# Patient Record
Sex: FEMALE | Race: OTHER | NOT HISPANIC OR LATINO | URBAN - METROPOLITAN AREA
[De-identification: names, ages, dates, MRNs, and addresses within clinical notes are randomized per-mention and may not be internally consistent; named-entity substitution may affect disease eponyms.]

---

## 2019-07-06 ENCOUNTER — INPATIENT (INPATIENT)
Facility: HOSPITAL | Age: 74
LOS: 2 days | Discharge: HOME | End: 2019-07-09
Attending: HOSPITALIST | Admitting: HOSPITALIST
Payer: COMMERCIAL

## 2019-07-06 VITALS
HEART RATE: 124 BPM | OXYGEN SATURATION: 97 % | DIASTOLIC BLOOD PRESSURE: 70 MMHG | TEMPERATURE: 98 F | RESPIRATION RATE: 18 BRPM | SYSTOLIC BLOOD PRESSURE: 116 MMHG

## 2019-07-06 DIAGNOSIS — Z29.9 ENCOUNTER FOR PROPHYLACTIC MEASURES, UNSPECIFIED: ICD-10-CM

## 2019-07-06 DIAGNOSIS — A41.9 SEPSIS, UNSPECIFIED ORGANISM: ICD-10-CM

## 2019-07-06 DIAGNOSIS — E03.9 HYPOTHYROIDISM, UNSPECIFIED: ICD-10-CM

## 2019-07-06 DIAGNOSIS — F32.9 MAJOR DEPRESSIVE DISORDER, SINGLE EPISODE, UNSPECIFIED: ICD-10-CM

## 2019-07-06 DIAGNOSIS — N39.0 URINARY TRACT INFECTION, SITE NOT SPECIFIED: ICD-10-CM

## 2019-07-06 DIAGNOSIS — E87.1 HYPO-OSMOLALITY AND HYPONATREMIA: ICD-10-CM

## 2019-07-06 DIAGNOSIS — J45.20 MILD INTERMITTENT ASTHMA, UNCOMPLICATED: ICD-10-CM

## 2019-07-06 DIAGNOSIS — Z96.651 PRESENCE OF RIGHT ARTIFICIAL KNEE JOINT: Chronic | ICD-10-CM

## 2019-07-06 DIAGNOSIS — N17.9 ACUTE KIDNEY FAILURE, UNSPECIFIED: ICD-10-CM

## 2019-07-06 DIAGNOSIS — I10 ESSENTIAL (PRIMARY) HYPERTENSION: ICD-10-CM

## 2019-07-06 LAB
ALBUMIN SERPL ELPH-MCNC: 3.6 G/DL — SIGNIFICANT CHANGE UP (ref 3.3–5)
ALP SERPL-CCNC: 104 U/L — SIGNIFICANT CHANGE UP (ref 40–120)
ALT FLD-CCNC: 23 U/L — SIGNIFICANT CHANGE UP (ref 4–33)
ANION GAP SERPL CALC-SCNC: 16 MMO/L — HIGH (ref 7–14)
APPEARANCE UR: SIGNIFICANT CHANGE UP
AST SERPL-CCNC: 28 U/L — SIGNIFICANT CHANGE UP (ref 4–32)
BASE EXCESS BLDV CALC-SCNC: -2 MMOL/L — SIGNIFICANT CHANGE UP
BASOPHILS # BLD AUTO: 0.01 K/UL — SIGNIFICANT CHANGE UP (ref 0–0.2)
BASOPHILS NFR BLD AUTO: 0.1 % — SIGNIFICANT CHANGE UP (ref 0–2)
BILIRUB SERPL-MCNC: 1.1 MG/DL — SIGNIFICANT CHANGE UP (ref 0.2–1.2)
BILIRUB UR-MCNC: NEGATIVE — SIGNIFICANT CHANGE UP
BLOOD GAS VENOUS - CREATININE: 1.79 MG/DL — HIGH (ref 0.5–1.3)
BLOOD GAS VENOUS - FIO2: 21 — SIGNIFICANT CHANGE UP
BLOOD UR QL VISUAL: SIGNIFICANT CHANGE UP
BUN SERPL-MCNC: 20 MG/DL — SIGNIFICANT CHANGE UP (ref 7–23)
CALCIUM SERPL-MCNC: 9.1 MG/DL — SIGNIFICANT CHANGE UP (ref 8.4–10.5)
CHLORIDE BLDV-SCNC: 102 MMOL/L — SIGNIFICANT CHANGE UP (ref 96–108)
CHLORIDE SERPL-SCNC: 97 MMOL/L — LOW (ref 98–107)
CO2 SERPL-SCNC: 18 MMOL/L — LOW (ref 22–31)
COLOR SPEC: SIGNIFICANT CHANGE UP
CREAT SERPL-MCNC: 1.64 MG/DL — HIGH (ref 0.5–1.3)
EOSINOPHIL # BLD AUTO: 0 K/UL — SIGNIFICANT CHANGE UP (ref 0–0.5)
EOSINOPHIL NFR BLD AUTO: 0 % — SIGNIFICANT CHANGE UP (ref 0–6)
GAS PNL BLDV: 128 MMOL/L — LOW (ref 136–146)
GLUCOSE BLDV-MCNC: 103 MG/DL — HIGH (ref 70–99)
GLUCOSE SERPL-MCNC: 108 MG/DL — HIGH (ref 70–99)
GLUCOSE UR-MCNC: NEGATIVE — SIGNIFICANT CHANGE UP
HCO3 BLDV-SCNC: 23 MMOL/L — SIGNIFICANT CHANGE UP (ref 20–27)
HCT VFR BLD CALC: 32.6 % — LOW (ref 34.5–45)
HCT VFR BLDV CALC: 34.9 % — SIGNIFICANT CHANGE UP (ref 34.5–45)
HGB BLD-MCNC: 11.4 G/DL — LOW (ref 11.5–15.5)
HGB BLDV-MCNC: 11.3 G/DL — LOW (ref 11.5–15.5)
IMM GRANULOCYTES NFR BLD AUTO: 0.9 % — SIGNIFICANT CHANGE UP (ref 0–1.5)
KETONES UR-MCNC: NEGATIVE — SIGNIFICANT CHANGE UP
LACTATE BLDV-MCNC: 1.9 MMOL/L — SIGNIFICANT CHANGE UP (ref 0.5–2)
LEUKOCYTE ESTERASE UR-ACNC: SIGNIFICANT CHANGE UP
LIDOCAIN IGE QN: 28.1 U/L — SIGNIFICANT CHANGE UP (ref 7–60)
LYMPHOCYTES # BLD AUTO: 0.26 K/UL — LOW (ref 1–3.3)
LYMPHOCYTES # BLD AUTO: 3 % — LOW (ref 13–44)
MCHC RBC-ENTMCNC: 30.7 PG — SIGNIFICANT CHANGE UP (ref 27–34)
MCHC RBC-ENTMCNC: 35 % — SIGNIFICANT CHANGE UP (ref 32–36)
MCV RBC AUTO: 87.9 FL — SIGNIFICANT CHANGE UP (ref 80–100)
MONOCYTES # BLD AUTO: 0.1 K/UL — SIGNIFICANT CHANGE UP (ref 0–0.9)
MONOCYTES NFR BLD AUTO: 1.2 % — LOW (ref 2–14)
NEUTROPHILS # BLD AUTO: 8.16 K/UL — HIGH (ref 1.8–7.4)
NEUTROPHILS NFR BLD AUTO: 94.8 % — HIGH (ref 43–77)
NITRITE UR-MCNC: NEGATIVE — SIGNIFICANT CHANGE UP
NRBC # FLD: 0 K/UL — SIGNIFICANT CHANGE UP (ref 0–0)
PCO2 BLDV: 30 MMHG — LOW (ref 41–51)
PH BLDV: 7.47 PH — HIGH (ref 7.32–7.43)
PH UR: 6.5 — SIGNIFICANT CHANGE UP (ref 5–8)
PLATELET # BLD AUTO: 161 K/UL — SIGNIFICANT CHANGE UP (ref 150–400)
PMV BLD: 11 FL — SIGNIFICANT CHANGE UP (ref 7–13)
PO2 BLDV: 45 MMHG — HIGH (ref 35–40)
POTASSIUM BLDV-SCNC: 3.6 MMOL/L — SIGNIFICANT CHANGE UP (ref 3.4–4.5)
POTASSIUM SERPL-MCNC: 4.3 MMOL/L — SIGNIFICANT CHANGE UP (ref 3.5–5.3)
POTASSIUM SERPL-SCNC: 4.3 MMOL/L — SIGNIFICANT CHANGE UP (ref 3.5–5.3)
PROT SERPL-MCNC: 6.7 G/DL — SIGNIFICANT CHANGE UP (ref 6–8.3)
PROT UR-MCNC: 100 — HIGH
RBC # BLD: 3.71 M/UL — LOW (ref 3.8–5.2)
RBC # FLD: 12 % — SIGNIFICANT CHANGE UP (ref 10.3–14.5)
SAO2 % BLDV: 81.5 % — SIGNIFICANT CHANGE UP (ref 60–85)
SODIUM SERPL-SCNC: 131 MMOL/L — LOW (ref 135–145)
SP GR SPEC: > 1.04 — HIGH (ref 1–1.04)
UROBILINOGEN FLD QL: NORMAL — SIGNIFICANT CHANGE UP
WBC # BLD: 8.61 K/UL — SIGNIFICANT CHANGE UP (ref 3.8–10.5)
WBC # FLD AUTO: 8.61 K/UL — SIGNIFICANT CHANGE UP (ref 3.8–10.5)

## 2019-07-06 PROCEDURE — 99223 1ST HOSP IP/OBS HIGH 75: CPT

## 2019-07-06 PROCEDURE — 74177 CT ABD & PELVIS W/CONTRAST: CPT | Mod: 26

## 2019-07-06 RX ORDER — SODIUM CHLORIDE 9 MG/ML
1000 INJECTION, SOLUTION INTRAVENOUS ONCE
Refills: 0 | Status: COMPLETED | OUTPATIENT
Start: 2019-07-06 | End: 2019-07-06

## 2019-07-06 RX ORDER — ACETAMINOPHEN 500 MG
650 TABLET ORAL ONCE
Refills: 0 | Status: COMPLETED | OUTPATIENT
Start: 2019-07-06 | End: 2019-07-06

## 2019-07-06 RX ORDER — ACETAMINOPHEN 500 MG
975 TABLET ORAL ONCE
Refills: 0 | Status: COMPLETED | OUTPATIENT
Start: 2019-07-06 | End: 2019-07-06

## 2019-07-06 RX ORDER — SODIUM CHLORIDE 9 MG/ML
1000 INJECTION INTRAMUSCULAR; INTRAVENOUS; SUBCUTANEOUS ONCE
Refills: 0 | Status: COMPLETED | OUTPATIENT
Start: 2019-07-06 | End: 2019-07-06

## 2019-07-06 RX ORDER — LEVOTHYROXINE SODIUM 125 MCG
1 TABLET ORAL
Qty: 0 | Refills: 0 | DISCHARGE

## 2019-07-06 RX ORDER — SERTRALINE 25 MG/1
100 TABLET, FILM COATED ORAL DAILY
Refills: 0 | Status: DISCONTINUED | OUTPATIENT
Start: 2019-07-06 | End: 2019-07-09

## 2019-07-06 RX ORDER — SERTRALINE 25 MG/1
1 TABLET, FILM COATED ORAL
Qty: 0 | Refills: 0 | DISCHARGE

## 2019-07-06 RX ORDER — VANCOMYCIN HCL 1 G
1000 VIAL (EA) INTRAVENOUS ONCE
Refills: 0 | Status: COMPLETED | OUTPATIENT
Start: 2019-07-06 | End: 2019-07-06

## 2019-07-06 RX ORDER — PIPERACILLIN AND TAZOBACTAM 4; .5 G/20ML; G/20ML
3.38 INJECTION, POWDER, LYOPHILIZED, FOR SOLUTION INTRAVENOUS ONCE
Refills: 0 | Status: COMPLETED | OUTPATIENT
Start: 2019-07-06 | End: 2019-07-06

## 2019-07-06 RX ORDER — AMLODIPINE BESYLATE 2.5 MG/1
1 TABLET ORAL
Qty: 0 | Refills: 0 | DISCHARGE

## 2019-07-06 RX ORDER — ACETAMINOPHEN 500 MG
650 TABLET ORAL ONCE
Refills: 0 | Status: DISCONTINUED | OUTPATIENT
Start: 2019-07-06 | End: 2019-07-06

## 2019-07-06 RX ORDER — CEFTRIAXONE 500 MG/1
1000 INJECTION, POWDER, FOR SOLUTION INTRAMUSCULAR; INTRAVENOUS EVERY 24 HOURS
Refills: 0 | Status: DISCONTINUED | OUTPATIENT
Start: 2019-07-06 | End: 2019-07-09

## 2019-07-06 RX ORDER — AMLODIPINE BESYLATE 2.5 MG/1
5 TABLET ORAL DAILY
Refills: 0 | Status: DISCONTINUED | OUTPATIENT
Start: 2019-07-06 | End: 2019-07-09

## 2019-07-06 RX ORDER — BUDESONIDE AND FORMOTEROL FUMARATE DIHYDRATE 160; 4.5 UG/1; UG/1
2 AEROSOL RESPIRATORY (INHALATION)
Qty: 0 | Refills: 0 | DISCHARGE

## 2019-07-06 RX ORDER — BUDESONIDE AND FORMOTEROL FUMARATE DIHYDRATE 160; 4.5 UG/1; UG/1
2 AEROSOL RESPIRATORY (INHALATION)
Refills: 0 | Status: DISCONTINUED | OUTPATIENT
Start: 2019-07-06 | End: 2019-07-09

## 2019-07-06 RX ORDER — LEVOTHYROXINE SODIUM 125 MCG
50 TABLET ORAL DAILY
Refills: 0 | Status: DISCONTINUED | OUTPATIENT
Start: 2019-07-06 | End: 2019-07-09

## 2019-07-06 RX ADMIN — Medication 975 MILLIGRAM(S): at 07:22

## 2019-07-06 RX ADMIN — SODIUM CHLORIDE 1000 MILLILITER(S): 9 INJECTION, SOLUTION INTRAVENOUS at 07:25

## 2019-07-06 RX ADMIN — Medication 650 MILLIGRAM(S): at 23:11

## 2019-07-06 RX ADMIN — PIPERACILLIN AND TAZOBACTAM 200 GRAM(S): 4; .5 INJECTION, POWDER, LYOPHILIZED, FOR SOLUTION INTRAVENOUS at 08:22

## 2019-07-06 RX ADMIN — Medication 975 MILLIGRAM(S): at 08:01

## 2019-07-06 RX ADMIN — BUDESONIDE AND FORMOTEROL FUMARATE DIHYDRATE 2 PUFF(S): 160; 4.5 AEROSOL RESPIRATORY (INHALATION) at 21:38

## 2019-07-06 RX ADMIN — Medication 250 MILLIGRAM(S): at 07:12

## 2019-07-06 RX ADMIN — SODIUM CHLORIDE 2000 MILLILITER(S): 9 INJECTION INTRAMUSCULAR; INTRAVENOUS; SUBCUTANEOUS at 07:12

## 2019-07-06 RX ADMIN — CEFTRIAXONE 100 MILLIGRAM(S): 500 INJECTION, POWDER, FOR SOLUTION INTRAMUSCULAR; INTRAVENOUS at 18:04

## 2019-07-06 NOTE — H&P ADULT - NSHPREVIEWOFSYSTEMS_GEN_ALL_CORE
CONSTITUTIONAL: +fevers, +fatigue   RESPIRATORY: No cough, wheezing, chills or hemoptysis; No shortness of breath  CARDIOVASCULAR: No chest pain, palpitations, dizziness, or leg swelling  GASTROINTESTINAL: +lower abdominal pain, +N/V  GENITOURINARY: +dysuria, +frequency, no hematuria  NEUROLOGICAL: No headaches, memory loss, loss of strength, numbness, or tremors  SKIN: No itching, burning, rashes, or lesions   MUSCULOSKELETAL: +bilateral flank pain  PSYCHIATRIC: +depression +difficulty sleeping   HEME/LYMPH: No easy bruising, or bleeding gums

## 2019-07-06 NOTE — ED PROVIDER NOTE - ATTENDING CONTRIBUTION TO CARE
74 year old with abd n/v. febrile concern for uti vs pyelo vs bactermia vs pna vs intrabd infection vs gb disease vs pancreatitis. will check labs, give abx, fluids, sepsis work up. admit

## 2019-07-06 NOTE — ED ADULT NURSE REASSESSMENT NOTE - NS ED NURSE REASSESS COMMENT FT1
receiving pt from night RN. pt aox3. pt reports she is visiting from [place] and has had 3 days of abdominal pain, lower back pain, n/v, decreased po intake, fevers. pt reports feeling better after arriving at hospital. pt denies chest pain, sob, n/v. pt reports still feeling weak and light headed. zosyn infusing well. VS as noted

## 2019-07-06 NOTE — H&P ADULT - HISTORY OF PRESENT ILLNESS
74F HTN, asthma, hypothyroidism, depression ( passed away 3 months ago from prostate CA) presents with fevers/chills and dysuria.  Pt has been visiting for past month from [place] and was supposed to fly back home tonight.  Her symptoms began 4 days ago starting with weakness, vomiting, lower abdominal pain and fevers/chills.  She notes burning with urination and mild bilateral flank pain.  She went to an urgent care center and was prescribed Cipro; she took 1 day's worth and symptoms worsened.  She denies hematuria, URI symptoms, recent hospitalizations, or recent sick contacts.  She has never had a UTI before and is concerned she contracted an infection from using dirty toilets at the home she's been staying in while in the [place].  Today she has been able to tolerate PO intake.

## 2019-07-06 NOTE — H&P ADULT - PROBLEM SELECTOR PLAN 1
- Symptoms concerning for early pyelonephritis  - Will continue with IV ceftriaxone, deescalate based on UCx sensitivities   - F/U BCx  - s/p 2L IVF (1L NS and 1L LR), encourage PO hydration now that she is able to tolerate PO

## 2019-07-06 NOTE — H&P ADULT - PROBLEM SELECTOR PLAN 2
- Resume home amlodipine - Likely sepsis mediated in addition to dehydration in setting of 3 days of N/V  - s/p 2L IVF   - Encourage PO hydration  - Daily BMP

## 2019-07-06 NOTE — ED ADULT NURSE NOTE - NSIMPLEMENTINTERV_GEN_ALL_ED
Implemented All Universal Safety Interventions:  [place] to call system. Call bell, personal items and telephone within reach. Instruct patient to call for assistance. Room bathroom lighting operational. Non-slip footwear when patient is off stretcher. Physically safe environment: no spills, clutter or unnecessary equipment. Stretcher in lowest position, wheels locked, appropriate side rails in place.

## 2019-07-06 NOTE — ED PROVIDER NOTE - CLINICAL SUMMARY MEDICAL DECISION MAKING FREE TEXT BOX
Pt visiting from [place], high-grade fever T104, abd pain, N/V. CT A/P with IV contrast. CBC, CMP, lipase, BCx, UA, UCx, EKG. Vanc/zosyn, 2L IVF.

## 2019-07-06 NOTE — H&P ADULT - PROBLEM SELECTOR PLAN 5
- No signs of asthma exacerbation  - Resume Symbicort, pt does not use rescue inhaler - Resume home synthroid

## 2019-07-06 NOTE — H&P ADULT - PROBLEM SELECTOR PLAN 6
RISK                                                                Points  [  ] Previous VTE                                                  3  [  ] Thrombophilia                                               2  [  ] Lower limb paralysis                                      2        (unable to hold up >15 seconds)    [  ] Current Cancer                                              2         (within 6 months)  [  ] Immobilization > 24 hrs                                1  [  ] ICU/CCU stay > 24 hours                              1  [ x ] Age > 60                                                      1    IMPROVE VTE Score 1    IMPROVE Score 0-1: Low Risk, No VTE prophylaxis required for most patients, encourage ambulation. - Resume home sertraline  - Pt also reports being on sleeping pill, but unable to recall which one.  Niece will be bringing in all medications

## 2019-07-06 NOTE — H&P ADULT - PROBLEM SELECTOR PLAN 8
RISK                                                                Points  [  ] Previous VTE                                                  3  [  ] Thrombophilia                                               2  [  ] Lower limb paralysis                                      2        (unable to hold up >15 seconds)    [  ] Current Cancer                                              2         (within 6 months)  [  ] Immobilization > 24 hrs                                1  [  ] ICU/CCU stay > 24 hours                              1  [ x ] Age > 60                                                      1    IMPROVE VTE Score 1    IMPROVE Score 0-1: Low Risk, No VTE prophylaxis required for most patients, encourage ambulation.

## 2019-07-06 NOTE — H&P ADULT - NSHPLABSRESULTS_GEN_ALL_CORE
LABS:                        11.4   8.61  )-----------( 161      ( 06 Jul 2019 07:00 )             32.6     07-06    131<L>  |  97<L>  |  20  ----------------------------<  108<H>  4.3   |  18<L>  |  1.64<H>    Ca    9.1      06 Jul 2019 07:00    TPro  6.7  /  Alb  3.6  /  TBili  1.1  /  DBili  x   /  AST  28  /  ALT  23  /  AlkPhos  104  07-06          Urinalysis Basic - ( 06 Jul 2019 10:49 )    Color: LIGHT ORANGE / Appearance: TURBID / SG: > 1.040 / pH: 6.5  Gluc: NEGATIVE / Ketone: NEGATIVE  / Bili: NEGATIVE / Urobili: NORMAL   Blood: SMALL / Protein: 100 / Nitrite: NEGATIVE   Leuk Esterase: LARGE / RBC: x / WBC x   Sq Epi: x / Non Sq Epi: x / Bacteria: x        RADIOLOGY & ADDITIONAL TESTS:    Imaging Personally Reviewed:  < from: CT Abdomen and Pelvis w/ IV Cont (07.06.19 @ 09:21) >    FINDINGS:    LOWER CHEST: Within normal limits.    LIVER: Nonspecific periportal edema.  BILE DUCTS: Normal caliber.  GALLBLADDER: Within normal limits.  SPLEEN: Within normal limits.  PANCREAS: Within normal limits.  ADRENALS: Within normal limits.  KIDNEYS/URETERS: Urothelial enhancement of both ureters raising concern   for ascending urinary tract infection. No renal stones or hydronephrosis.    BLADDER: Mild diffuse wall thickening with perivesicular inflammation   consistent with cystitis.  REPRODUCTIVE ORGANS: Hysterectomy.    BOWEL: No bowel obstruction. Appendix is normal.  PERITONEUM: No ascites.  VESSELS:  Within normal limits.  RETROPERITONEUM: No lymphadenopathy.    ABDOMINAL WALL: Within normal limits.  BONES: Degenerative changes.    IMPRESSION:     Urinary tract infection.    No renal stones or hydronephrosis.

## 2019-07-06 NOTE — ED ADULT TRIAGE NOTE - CHIEF COMPLAINT QUOTE
Pt visiting from [place] arrives via [facility]. Pt st" I have been vomiting and not feeling well for 3 days  and I have fevers, chill... I saw A doctor treated for Urinary tract infection he  gave me Cipro & prilosec, and something for nausea. I still  have abd pains." +vomiting enroute, c/o chills Pt appears uncomfortable. Pt visiting from [place] arrives via [facility]. Pt st" I have been vomiting and not feeling well for 3 days  and I have fevers, chill... I saw A doctor treated for Urinary tract infection he  gave me Cipro & prilosec, and something for nausea. I still  have abd pains." +vomiting enroute, c/o chills Pt appears uncomfortable, Skin hot to touch, c/o back pains. Code sepsis called pt to rm 23.

## 2019-07-06 NOTE — ED PROVIDER NOTE - OBJECTIVE STATEMENT
73yo F with hx HTN, asthma, hypothyroid, depression presents with abd pain, fever, vomiting. Nieces Barbara and Surchan at bedside. Pt is visiting from [place] arrived 6/12. 3 days ago she developed epigastric pain, NBNB emesis multiple times a day. She also notes lower back pain along the spine. She is unable to tolerate PO and feels weak in her legs and dizzy. She feels hot and cold. Denies recent etoh, NSAID. Pt saw a doctor yesterday who diagnosed her with a UTI and she took 1 day of cipro 500mg BID, started on Pepcid. She had a normal BM yesterday. Denies sick contacts. Denies CP, SOB, HA, numbness, sore throat, cough 75yo F with hx HTN, asthma, hypothyroid, depression presents with abd pain, fever, vomiting. Nieces Barbara and Surchan at bedside. Pt is visiting from [place] arrived 6/12. 3 days ago she developed epigastric pain, NBNB emesis multiple times a day. She also notes lower back pain along the spine. She is unable to tolerate PO and feels weak in her legs and dizzy. She feels hot and cold. Denies recent etoh, NSAID. Pt saw a doctor yesterday who diagnosed her with a UTI and she took 1 day of cipro 500mg BID, started on Pepcid. She had a normal BM yesterday. Denies sick contacts. Denies CP, SOB, HA, numbness, sore throat, cough, urinary symptoms, diarrhea, melena, hematochezia.    Doctor in [place] Dr. Erica Graham

## 2019-07-06 NOTE — ED PROVIDER NOTE - PHYSICAL EXAMINATION
GENERAL: No acute distress, well-developed  HEAD:  Atraumatic, Normocephalic  ENT: PERRL, conjunctiva and sclera clear, neck supple, no JVD, mucous membrane dry, posterior oropharynx clear, no cervical LAD  CHEST/LUNG: Clear to auscultation bilaterally; No wheeze, equal breath sounds bilaterally, respirations nonlabored, on RA  HEART: Regular rate and rhythm; No murmurs, rubs, or gallops  ABDOMEN: Soft, +epigastric tenderness, nondistended; Bowel sounds present, no organomegaly, no guarding or mass  BACK: no spinal tenderness/deformities, no CVA tenderness  EXTREMITIES:  No clubbing, cyanosis, or edema  PSYCH: Nl behavior, nl affect  NEUROLOGY: AAOx3, non-focal, moves all extremities spontaneously  SKIN: Normal color, No rashes or lesions GENERAL: No acute distress, well-developed  HEAD:  Atraumatic, Normocephalic  ENT: PERRL, conjunctiva and sclera clear, neck supple, no JVD, mucous membrane dry, posterior oropharynx clear, no cervical LAD  CHEST/LUNG: Clear to auscultation bilaterally; No wheeze, equal breath sounds bilaterally, respirations nonlabored, on RA  HEART: Regular rate and rhythm; No murmurs, rubs, or gallops  ABDOMEN: Soft, +epigastric/RUQ tenderness, nondistended; Bowel sounds present, no organomegaly, no guarding or mass  BACK: no spinal tenderness/deformities, no CVA tenderness  EXTREMITIES:  No clubbing, cyanosis, or edema  PSYCH: Nl behavior, nl affect  NEUROLOGY: AAOx3, non-focal, moves all extremities spontaneously  SKIN: Normal color, No rashes or lesions

## 2019-07-06 NOTE — ED ADULT NURSE NOTE - CHIEF COMPLAINT QUOTE
Pt visiting from [place] arrives via [facility]. Pt st" I have been vomiting and not feeling well for 3 days  and I have fevers, chill... I saw A doctor treated for Urinary tract infection he  gave me Cipro & prilosec, and something for nausea. I still  have abd pains." +vomiting enroute, c/o chills Pt appears uncomfortable, Skin hot to touch, c/o back pains. Code sepsis called pt to rm 23.

## 2019-07-06 NOTE — H&P ADULT - ASSESSMENT
74F HTN, asthma, hypothyroidism, depression presents with sepsis 2/2 UTI/pyelonephritis 74F HTN, asthma, hypothyroidism, depression presents with severe sepsis 2/2 UTI/pyelonephritis associated with acute renal failure (sepsis mediated).

## 2019-07-06 NOTE — ED ADULT NURSE NOTE - OBJECTIVE STATEMENT
Pt received in room 23, a/o x4. Pt here visiting family from [place], d/t return home tonight arrives c/o not feeling well for the last 3 days, fever/chills, vomiting for the last 1-2 days with back pain. Pt went to see families Pt received in room 23, a/o x4. Pt here visiting family from [place], d/t return home tonight arrives c/o not feeling well for the last 3 days, fever/chills, vomiting for the last 1-2 days with back pain. Pt went to see families PMD yesterday and was found to have UTI, then started on abx. Pt symptoms continued to worsen and pt came in today for further evaluation. IV access placed to LAC 20G, labs sent per order and pt awaiting further evaluation and results. Will monitor and await further orders.

## 2019-07-06 NOTE — H&P ADULT - PROBLEM SELECTOR PLAN 4
- Resume home sertraline  - Pt also reports being on sleeping pill, but unable to recall which one.  Niece will be bringing in all medications - Resume home amlodipine

## 2019-07-06 NOTE — H&P ADULT - NSHPPHYSICALEXAM_GEN_ALL_CORE
GENERAL: NAD, well-developed  EYES: EOMI, PERRLA, conjunctiva and sclera clear  NECK: Supple, No JVD  CHEST/LUNG: Clear to auscultation bilaterally; No wheeze  HEART: Regular rate and rhythm; No murmurs, rubs, or gallops  ABDOMEN: Soft, +tenderness suprapubic region, Nondistended; Bowel sounds present  EXTREMITIES:  2+ Peripheral Pulses, No clubbing, cyanosis, or edema, +CVA tenderness bilaterally   PSYCH: AAOx3  NEUROLOGY: non-focal  SKIN: No rashes or lesions

## 2019-07-07 LAB
ANION GAP SERPL CALC-SCNC: 13 MMO/L — SIGNIFICANT CHANGE UP (ref 7–14)
BACTERIA UR CULT: SIGNIFICANT CHANGE UP
BASOPHILS # BLD AUTO: 0.05 K/UL — SIGNIFICANT CHANGE UP (ref 0–0.2)
BASOPHILS NFR BLD AUTO: 0.4 % — SIGNIFICANT CHANGE UP (ref 0–2)
BUN SERPL-MCNC: 18 MG/DL — SIGNIFICANT CHANGE UP (ref 7–23)
CALCIUM SERPL-MCNC: 9 MG/DL — SIGNIFICANT CHANGE UP (ref 8.4–10.5)
CHLORIDE SERPL-SCNC: 101 MMOL/L — SIGNIFICANT CHANGE UP (ref 98–107)
CO2 SERPL-SCNC: 20 MMOL/L — LOW (ref 22–31)
CREAT SERPL-MCNC: 1.22 MG/DL — SIGNIFICANT CHANGE UP (ref 0.5–1.3)
EOSINOPHIL # BLD AUTO: 0.21 K/UL — SIGNIFICANT CHANGE UP (ref 0–0.5)
EOSINOPHIL NFR BLD AUTO: 1.5 % — SIGNIFICANT CHANGE UP (ref 0–6)
GLUCOSE SERPL-MCNC: 87 MG/DL — SIGNIFICANT CHANGE UP (ref 70–99)
HCT VFR BLD CALC: 30.3 % — LOW (ref 34.5–45)
HGB BLD-MCNC: 10 G/DL — LOW (ref 11.5–15.5)
IMM GRANULOCYTES NFR BLD AUTO: 0.6 % — SIGNIFICANT CHANGE UP (ref 0–1.5)
LYMPHOCYTES # BLD AUTO: 1.86 K/UL — SIGNIFICANT CHANGE UP (ref 1–3.3)
LYMPHOCYTES # BLD AUTO: 13.3 % — SIGNIFICANT CHANGE UP (ref 13–44)
MCHC RBC-ENTMCNC: 29.1 PG — SIGNIFICANT CHANGE UP (ref 27–34)
MCHC RBC-ENTMCNC: 33 % — SIGNIFICANT CHANGE UP (ref 32–36)
MCV RBC AUTO: 88.1 FL — SIGNIFICANT CHANGE UP (ref 80–100)
METHOD TYPE: SIGNIFICANT CHANGE UP
MONOCYTES # BLD AUTO: 1.01 K/UL — HIGH (ref 0–0.9)
MONOCYTES NFR BLD AUTO: 7.2 % — SIGNIFICANT CHANGE UP (ref 2–14)
NEUTROPHILS # BLD AUTO: 10.81 K/UL — HIGH (ref 1.8–7.4)
NEUTROPHILS NFR BLD AUTO: 77 % — SIGNIFICANT CHANGE UP (ref 43–77)
NRBC # FLD: 0 K/UL — SIGNIFICANT CHANGE UP (ref 0–0)
ORGANISM # SPEC MICROSCOPIC CNT: SIGNIFICANT CHANGE UP
ORGANISM # SPEC MICROSCOPIC CNT: SIGNIFICANT CHANGE UP
PLATELET # BLD AUTO: 149 K/UL — LOW (ref 150–400)
PMV BLD: 10.8 FL — SIGNIFICANT CHANGE UP (ref 7–13)
POTASSIUM SERPL-MCNC: 3.6 MMOL/L — SIGNIFICANT CHANGE UP (ref 3.5–5.3)
POTASSIUM SERPL-SCNC: 3.6 MMOL/L — SIGNIFICANT CHANGE UP (ref 3.5–5.3)
RBC # BLD: 3.44 M/UL — LOW (ref 3.8–5.2)
RBC # FLD: 12 % — SIGNIFICANT CHANGE UP (ref 10.3–14.5)
SODIUM SERPL-SCNC: 134 MMOL/L — LOW (ref 135–145)
SPECIMEN SOURCE: SIGNIFICANT CHANGE UP
WBC # BLD: 14.02 K/UL — HIGH (ref 3.8–10.5)
WBC # FLD AUTO: 14.02 K/UL — HIGH (ref 3.8–10.5)

## 2019-07-07 PROCEDURE — 99233 SBSQ HOSP IP/OBS HIGH 50: CPT

## 2019-07-07 RX ORDER — SENNA PLUS 8.6 MG/1
2 TABLET ORAL AT BEDTIME
Refills: 0 | Status: DISCONTINUED | OUTPATIENT
Start: 2019-07-07 | End: 2019-07-09

## 2019-07-07 RX ORDER — ACETAMINOPHEN 500 MG
650 TABLET ORAL EVERY 6 HOURS
Refills: 0 | Status: DISCONTINUED | OUTPATIENT
Start: 2019-07-07 | End: 2019-07-09

## 2019-07-07 RX ORDER — DOCUSATE SODIUM 100 MG
100 CAPSULE ORAL THREE TIMES A DAY
Refills: 0 | Status: DISCONTINUED | OUTPATIENT
Start: 2019-07-07 | End: 2019-07-09

## 2019-07-07 RX ORDER — LACTOBACILLUS ACIDOPHILUS 100MM CELL
1 CAPSULE ORAL DAILY
Refills: 0 | Status: DISCONTINUED | OUTPATIENT
Start: 2019-07-07 | End: 2019-07-09

## 2019-07-07 RX ADMIN — BUDESONIDE AND FORMOTEROL FUMARATE DIHYDRATE 2 PUFF(S): 160; 4.5 AEROSOL RESPIRATORY (INHALATION) at 21:26

## 2019-07-07 RX ADMIN — BUDESONIDE AND FORMOTEROL FUMARATE DIHYDRATE 2 PUFF(S): 160; 4.5 AEROSOL RESPIRATORY (INHALATION) at 09:09

## 2019-07-07 RX ADMIN — CEFTRIAXONE 100 MILLIGRAM(S): 500 INJECTION, POWDER, FOR SOLUTION INTRAMUSCULAR; INTRAVENOUS at 17:05

## 2019-07-07 RX ADMIN — Medication 650 MILLIGRAM(S): at 17:05

## 2019-07-07 RX ADMIN — Medication 50 MICROGRAM(S): at 06:32

## 2019-07-07 RX ADMIN — SERTRALINE 100 MILLIGRAM(S): 25 TABLET, FILM COATED ORAL at 13:09

## 2019-07-07 RX ADMIN — Medication 650 MILLIGRAM(S): at 16:16

## 2019-07-07 RX ADMIN — Medication 100 MILLIGRAM(S): at 23:38

## 2019-07-07 RX ADMIN — Medication 650 MILLIGRAM(S): at 00:00

## 2019-07-07 RX ADMIN — Medication 1 TABLET(S): at 13:09

## 2019-07-07 RX ADMIN — SENNA PLUS 2 TABLET(S): 8.6 TABLET ORAL at 23:38

## 2019-07-07 RX ADMIN — AMLODIPINE BESYLATE 5 MILLIGRAM(S): 2.5 TABLET ORAL at 06:32

## 2019-07-07 NOTE — PROGRESS NOTE ADULT - PROBLEM SELECTOR PLAN 1
+ e coli bacteremia, await sensitivity   c/w IV ceftriaxone for now since pt clinically improved   repeat BCx + e coli bacteremia, await sensitivity   c/w IV ceftriaxone for now since pt clinically improved   repeat BCx  urine cx NGTD

## 2019-07-07 NOTE — PROGRESS NOTE ADULT - PROBLEM SELECTOR PLAN 6
- c/w home sertraline  - Pt also reports being on sleeping pill, but unable to recall which one.  Niece will be bringing in all medications for verification

## 2019-07-07 NOTE — PROGRESS NOTE ADULT - SUBJECTIVE AND OBJECTIVE BOX
Patient is a 74y old  Female who presents with a chief complaint of Fevers 2/2 UTI (06 Jul 2019 15:13)      SUBJECTIVE / OVERNIGHT EVENTS:  Pt reports improvement overall. No dysuria at this time. Appetite improved a lot. still has some back pain, but improved. no cp/sob/fever/chills     MEDICATIONS  (STANDING):  amLODIPine   Tablet 5 milliGRAM(s) Oral daily  buDESOnide  80 MICROgram(s)/formoterol 4.5 MICROgram(s) Inhaler 2 Puff(s) Inhalation two times a day  cefTRIAXone   IVPB 1000 milliGRAM(s) IV Intermittent every 24 hours  lactobacillus acidophilus 1 Tablet(s) Oral daily  levothyroxine 50 MICROGram(s) Oral daily  sertraline 100 milliGRAM(s) Oral daily    MEDICATIONS  (PRN):      T(C): 36.5 (07-07-19 @ 06:27), Max: 36.5 (07-07-19 @ 06:27)  HR: 92 (07-07-19 @ 06:27) (79 - 92)  BP: 145/69 (07-07-19 @ 06:27) (100/83 - 145/69)  RR: 18 (07-07-19 @ 06:27) (17 - 18)  SpO2: 99% (07-07-19 @ 06:27) (98% - 99%)  CAPILLARY BLOOD GLUCOSE        I&O's Summary      PHYSICAL EXAM:  GENERAL: NAD, well-developed  HEAD:  Atraumatic, Normocephalic  EYES: EOMI, PERRLA, conjunctiva and sclera clear  NECK: Supple, No JVD  CHEST/LUNG: Clear to auscultation bilaterally; No wheeze  HEART: s1 s2, regular rhythm and rate   ABDOMEN: Soft, Nontender, Nondistended; Bowel sounds present, no CVA tenderness   EXTREMITIES:  2+ Peripheral Pulses, No clubbing, cyanosis, or edema  PSYCH: AAOx3, calm   NEUROLOGY: non-focal  SKIN: No rashes or lesions    LABS:                        10.0   14.02 )-----------( 149      ( 07 Jul 2019 06:18 )             30.3     07-07    134<L>  |  101  |  18  ----------------------------<  87  3.6   |  20<L>  |  1.22    Ca    9.0      07 Jul 2019 06:18    TPro  6.7  /  Alb  3.6  /  TBili  1.1  /  DBili  x   /  AST  28  /  ALT  23  /  AlkPhos  104  07-06          Urinalysis Basic - ( 06 Jul 2019 10:49 )    Color: LIGHT ORANGE / Appearance: TURBID / SG: > 1.040 / pH: 6.5  Gluc: NEGATIVE / Ketone: NEGATIVE  / Bili: NEGATIVE / Urobili: NORMAL   Blood: SMALL / Protein: 100 / Nitrite: NEGATIVE   Leuk Esterase: LARGE / RBC: x / WBC x   Sq Epi: x / Non Sq Epi: x / Bacteria: x        RADIOLOGY & ADDITIONAL TESTS:    Imaging Personally Reviewed:    Consultant(s) Notes Reviewed:      Care Discussed with Consultants/Other Providers:

## 2019-07-07 NOTE — PROGRESS NOTE ADULT - PROBLEM SELECTOR PLAN 8
RISK                                                                Points  [  ] Previous VTE                                                  3  [  ] Thrombophilia                                               2  [  ] Lower limb paralysis                                      2        (unable to hold up >15 seconds)    [  ] Current Cancer                                              2         (within 6 months)  [  ] Immobilization > 24 hrs                                1  [  ] ICU/CCU stay > 24 hours                              1  [ x ] Age > 60                                                      1    IMPROVE VTE Score 1    IMPROVE Score 0-1: Low Risk, encourage ambulation.

## 2019-07-08 LAB
ANION GAP SERPL CALC-SCNC: 13 MMO/L — SIGNIFICANT CHANGE UP (ref 7–14)
BASOPHILS # BLD AUTO: 0.06 K/UL — SIGNIFICANT CHANGE UP (ref 0–0.2)
BASOPHILS NFR BLD AUTO: 0.6 % — SIGNIFICANT CHANGE UP (ref 0–2)
BUN SERPL-MCNC: 13 MG/DL — SIGNIFICANT CHANGE UP (ref 7–23)
CALCIUM SERPL-MCNC: 9.5 MG/DL — SIGNIFICANT CHANGE UP (ref 8.4–10.5)
CHLORIDE SERPL-SCNC: 103 MMOL/L — SIGNIFICANT CHANGE UP (ref 98–107)
CO2 SERPL-SCNC: 23 MMOL/L — SIGNIFICANT CHANGE UP (ref 22–31)
CREAT SERPL-MCNC: 1.01 MG/DL — SIGNIFICANT CHANGE UP (ref 0.5–1.3)
EOSINOPHIL # BLD AUTO: 0.2 K/UL — SIGNIFICANT CHANGE UP (ref 0–0.5)
EOSINOPHIL NFR BLD AUTO: 2.1 % — SIGNIFICANT CHANGE UP (ref 0–6)
GLUCOSE SERPL-MCNC: 104 MG/DL — HIGH (ref 70–99)
HCT VFR BLD CALC: 31.2 % — LOW (ref 34.5–45)
HGB BLD-MCNC: 10.4 G/DL — LOW (ref 11.5–15.5)
IMM GRANULOCYTES NFR BLD AUTO: 0.7 % — SIGNIFICANT CHANGE UP (ref 0–1.5)
LYMPHOCYTES # BLD AUTO: 1.96 K/UL — SIGNIFICANT CHANGE UP (ref 1–3.3)
LYMPHOCYTES # BLD AUTO: 20.7 % — SIGNIFICANT CHANGE UP (ref 13–44)
MAGNESIUM SERPL-MCNC: 2 MG/DL — SIGNIFICANT CHANGE UP (ref 1.6–2.6)
MCHC RBC-ENTMCNC: 29.6 PG — SIGNIFICANT CHANGE UP (ref 27–34)
MCHC RBC-ENTMCNC: 33.3 % — SIGNIFICANT CHANGE UP (ref 32–36)
MCV RBC AUTO: 88.9 FL — SIGNIFICANT CHANGE UP (ref 80–100)
MONOCYTES # BLD AUTO: 0.97 K/UL — HIGH (ref 0–0.9)
MONOCYTES NFR BLD AUTO: 10.3 % — SIGNIFICANT CHANGE UP (ref 2–14)
NEUTROPHILS # BLD AUTO: 6.2 K/UL — SIGNIFICANT CHANGE UP (ref 1.8–7.4)
NEUTROPHILS NFR BLD AUTO: 65.6 % — SIGNIFICANT CHANGE UP (ref 43–77)
NRBC # FLD: 0 K/UL — SIGNIFICANT CHANGE UP (ref 0–0)
ORGANISM # SPEC MICROSCOPIC CNT: SIGNIFICANT CHANGE UP
PHOSPHATE SERPL-MCNC: 3.8 MG/DL — SIGNIFICANT CHANGE UP (ref 2.5–4.5)
PLATELET # BLD AUTO: 184 K/UL — SIGNIFICANT CHANGE UP (ref 150–400)
PMV BLD: 10.4 FL — SIGNIFICANT CHANGE UP (ref 7–13)
POTASSIUM SERPL-MCNC: 4.2 MMOL/L — SIGNIFICANT CHANGE UP (ref 3.5–5.3)
POTASSIUM SERPL-SCNC: 4.2 MMOL/L — SIGNIFICANT CHANGE UP (ref 3.5–5.3)
RBC # BLD: 3.51 M/UL — LOW (ref 3.8–5.2)
RBC # FLD: 12 % — SIGNIFICANT CHANGE UP (ref 10.3–14.5)
SODIUM SERPL-SCNC: 139 MMOL/L — SIGNIFICANT CHANGE UP (ref 135–145)
TSH SERPL-MCNC: 3.54 UIU/ML — SIGNIFICANT CHANGE UP (ref 0.27–4.2)
WBC # BLD: 9.46 K/UL — SIGNIFICANT CHANGE UP (ref 3.8–10.5)
WBC # FLD AUTO: 9.46 K/UL — SIGNIFICANT CHANGE UP (ref 3.8–10.5)

## 2019-07-08 PROCEDURE — 99232 SBSQ HOSP IP/OBS MODERATE 35: CPT

## 2019-07-08 RX ADMIN — SENNA PLUS 2 TABLET(S): 8.6 TABLET ORAL at 21:10

## 2019-07-08 RX ADMIN — BUDESONIDE AND FORMOTEROL FUMARATE DIHYDRATE 2 PUFF(S): 160; 4.5 AEROSOL RESPIRATORY (INHALATION) at 21:10

## 2019-07-08 RX ADMIN — CEFTRIAXONE 100 MILLIGRAM(S): 500 INJECTION, POWDER, FOR SOLUTION INTRAMUSCULAR; INTRAVENOUS at 17:04

## 2019-07-08 RX ADMIN — Medication 50 MICROGRAM(S): at 05:30

## 2019-07-08 RX ADMIN — Medication 1 TABLET(S): at 12:08

## 2019-07-08 RX ADMIN — AMLODIPINE BESYLATE 5 MILLIGRAM(S): 2.5 TABLET ORAL at 05:30

## 2019-07-08 RX ADMIN — BUDESONIDE AND FORMOTEROL FUMARATE DIHYDRATE 2 PUFF(S): 160; 4.5 AEROSOL RESPIRATORY (INHALATION) at 09:22

## 2019-07-08 RX ADMIN — Medication 100 MILLIGRAM(S): at 21:10

## 2019-07-08 RX ADMIN — SERTRALINE 100 MILLIGRAM(S): 25 TABLET, FILM COATED ORAL at 12:08

## 2019-07-08 NOTE — PROGRESS NOTE ADULT - PROBLEM SELECTOR PLAN 1
+ e coli bacteremia, await sensitivity   c/w IV ceftriaxone for now since pt clinically improved   repeat BCx  urine cx NGTD Pt with pyelonephritis c/b E. coli bacteremia, currently awaiting sensitivities. Sepsis resolved. urine cx NGTD  c/w IV ceftriaxone for now since pt clinically improved   - f/u repeat BCx

## 2019-07-08 NOTE — DISCHARGE NOTE PROVIDER - HOSPITAL COURSE
74F HTN, asthma, hypothyroidism, depression presents with severe sepsis 2/2 UTI/pyelonephritis associated with acute renal failure (sepsis mediated).             Hospital Course    Sepsis     -CT A/P- Urinary tract infection.    No renal stones or hydronephrosis.    -UA+, Ucx neg- S/p Rocephin     -Surveillance bcx from 7/8 NGTD ___, Blood cx +E-coli on 7/6        WHITNEY    - s/p IVF    - Encouraged PO    -Trended Cr, avoid nephrotoxins, renally dosed meds as indicated         Hyponatremia;    - in setting of N/V    - Improved         Essential hypertension    - home amlodipine         Depression    -sertraline        Hypothyroidism    home synthroid        Mild intermittent asthma    Symbicort        Dispo- Discharge to home 74F HTN, asthma, hypothyroidism, depression presents with severe sepsis 2/2 UTI/pyelonephritis associated with acute renal failure (sepsis mediated).         Hospital Course    Sepsis secondary Urinary Tract Infection    - Pt with pyelonephritis c/b E. coli bacteremia, Sepsis resolved. urine cx NGTD    - Ceftriaxone; DC with PO cipro to complete 7 day course     - rpt BCx NTD        Acute kidney injury.      - Likely sepsis mediated in addition to dehydration in setting of 3 days of N/V    - Cr improved     - Encourage PO hydration.         Hyponatremia.      - Resolved.        Essential hypertension.      - c/w home amlodipine    - BP in acceptable range.         Hypothyroidism, unspecified type.     - c/w home synthroid.         Depression, unspecified depression type.     - c/w home sertraline        Mild intermittent asthma without complication.     - No signs of asthma exacerbation    - c/w Symbicort, pt does not use rescue inhaler.         Dispo- Discharge to home; pt's hospital course was discussed with attending, pt is medically stable for DC

## 2019-07-08 NOTE — PROGRESS NOTE ADULT - SUBJECTIVE AND OBJECTIVE BOX
Patient is a 74y old  Female who presents with a chief complaint of Fevers 2/2 UTI (08 Jul 2019 12:43)        SUBJECTIVE / OVERNIGHT EVENTS:      MEDICATIONS  (STANDING):  amLODIPine   Tablet 5 milliGRAM(s) Oral daily  buDESOnide  80 MICROgram(s)/formoterol 4.5 MICROgram(s) Inhaler 2 Puff(s) Inhalation two times a day  cefTRIAXone   IVPB 1000 milliGRAM(s) IV Intermittent every 24 hours  docusate sodium 100 milliGRAM(s) Oral three times a day  lactobacillus acidophilus 1 Tablet(s) Oral daily  levothyroxine 50 MICROGram(s) Oral daily  senna 2 Tablet(s) Oral at bedtime  sertraline 100 milliGRAM(s) Oral daily    MEDICATIONS  (PRN):  acetaminophen   Tablet .. 650 milliGRAM(s) Oral every 6 hours PRN Moderate Pain (4 - 6), Severe Pain (7 - 10)      Vital Signs Last 24 Hrs  T(C): 36.6 (08 Jul 2019 12:10), Max: 36.7 (08 Jul 2019 05:37)  T(F): 97.8 (08 Jul 2019 12:10), Max: 98.1 (08 Jul 2019 05:37)  HR: 72 (08 Jul 2019 12:10) (72 - 87)  BP: 144/62 (08 Jul 2019 12:10) (144/62 - 149/82)  BP(mean): --  RR: 18 (08 Jul 2019 12:10) (18 - 18)  SpO2: 100% (08 Jul 2019 12:10) (100% - 100%)  CAPILLARY BLOOD GLUCOSE        I&O's Summary        PHYSICAL EXAM  GENERAL: NAD, well-developed  HEAD:  Atraumatic, Normocephalic  EYES: EOMI, PERRLA, conjunctiva and sclera clear  NECK: Supple, No JVD  CHEST/LUNG: Clear to auscultation bilaterally; No wheeze  HEART: Regular rate and rhythm; No murmurs, rubs, or gallops  ABDOMEN: Soft, Nontender, Nondistended; Bowel sounds present  EXTREMITIES:  2+ Peripheral Pulses, No clubbing, cyanosis, or edema  PSYCH: AAOx3  SKIN: No rashes or lesions        LABS:                        10.4   9.46  )-----------( 184      ( 08 Jul 2019 07:39 )             31.2     07-08    139  |  103  |  13  ----------------------------<  104<H>  4.2   |  23  |  1.01    Ca    9.5      08 Jul 2019 07:39  Phos  3.8     07-08  Mg     2.0     07-08          Culture - Urine (collected 06 Jul 2019 13:31)  Source: URINE MIDSTREAM  Final Report (07 Jul 2019 15:00):    NO GROWTH AT 24 HOURS    Culture - Blood (collected 06 Jul 2019 07:31)  Source: BLOOD VENOUS  Preliminary Report (08 Jul 2019 07:32):    NO ORGANISMS ISOLATED    NO ORGANISMS ISOLATED AT 48 HRS.    Culture - Blood (collected 06 Jul 2019 07:31)  Source: BLOOD PERIPHERAL  Preliminary Report (07 Jul 2019 10:50):    ***Blood Panel PCR results on this specimen are available    approximately 3 hours after the Gram stain result***    Gram stain, PCR, and/or culture results may not always    correspond due to difference in methodologies    ------------------------------------------------------------    This PCR assay was performed using [employer].  The    following targets are tested for:  Enterococcus, vancomycin    resistant enterococci, Listeria monocytogenes,  coagulase    negative staphylococci, S. aureus, methicillin resistant S.    aureus, Streptococcus agalactiae (Group B), S. pneumoniae,    S. pyogenes (Group A), Acinetobacter baumannii, Enterobacter    cloacae, E. coli, Klebsiella oxytoca, K. pneumoniae, Proteus    sp., Serratia marcescens, Haemophilus influenzae, Neisseria    meningitidis, Pseudomonas aeruginosa, Candida albicans, C.    glabrata, C. krusei, C. parapsilosis, C. tropicalis and the    KPC resistance gene.    **NOTE: Due to technical problems, Proteus sp. will NOT be    reported as part of the BCID paneluntil further notice.  Preliminary Report (07 Jul 2019 07:32):    NO ORGANISMS ISOLATED    NO ORGANISMS ISOLATED AT 24 HOURS  Organism: BLOOD CULTURE PCR  Escherichia coli (08 Jul 2019 13:49)  Organism: Escherichia coli (08 Jul 2019 13:49)  Organism: BLOOD CULTURE PCR (07 Jul 2019 10:50) Patient is a 74y old  Female who presents with a chief complaint of Fevers 2/2 UTI (08 Jul 2019 12:43)      SUBJECTIVE / OVERNIGHT EVENTS:  Patient feels well. She denies fever, chills, n/v, abdominal pain or dysuria. Patient would like to be able to leave the hospital as soon as possible.     MEDICATIONS  (STANDING):  amLODIPine   Tablet 5 milliGRAM(s) Oral daily  buDESOnide  80 MICROgram(s)/formoterol 4.5 MICROgram(s) Inhaler 2 Puff(s) Inhalation two times a day  cefTRIAXone   IVPB 1000 milliGRAM(s) IV Intermittent every 24 hours  docusate sodium 100 milliGRAM(s) Oral three times a day  lactobacillus acidophilus 1 Tablet(s) Oral daily  levothyroxine 50 MICROGram(s) Oral daily  senna 2 Tablet(s) Oral at bedtime  sertraline 100 milliGRAM(s) Oral daily    MEDICATIONS  (PRN):  acetaminophen   Tablet .. 650 milliGRAM(s) Oral every 6 hours PRN Moderate Pain (4 - 6), Severe Pain (7 - 10)      Vital Signs Last 24 Hrs  T(C): 36.6 (08 Jul 2019 12:10), Max: 36.7 (08 Jul 2019 05:37)  T(F): 97.8 (08 Jul 2019 12:10), Max: 98.1 (08 Jul 2019 05:37)  HR: 72 (08 Jul 2019 12:10) (72 - 87)  BP: 144/62 (08 Jul 2019 12:10) (144/62 - 149/82)  BP(mean): --  RR: 18 (08 Jul 2019 12:10) (18 - 18)  SpO2: 100% (08 Jul 2019 12:10) (100% - 100%)          PHYSICAL EXAM  GENERAL: NAD, well-appearing  CHEST/LUNG: Clear to auscultation bilaterally; No wheeze  HEART: Regular rate and rhythm; No murmurs, rubs, or gallops  ABDOMEN: Soft, Nontender, Nondistended; Bowel sounds present  EXTREMITIES:  2+ Peripheral Pulses, No clubbing, cyanosis, or edema  PSYCH: AAOx3          LABS:                        10.4   9.46  )-----------( 184      ( 08 Jul 2019 07:39 )             31.2     07-08    139  |  103  |  13  ----------------------------<  104<H>  4.2   |  23  |  1.01    Ca    9.5      08 Jul 2019 07:39  Phos  3.8     07-08  Mg     2.0     07-08          Culture - Urine (collected 06 Jul 2019 13:31)  Source: URINE MIDSTREAM  Final Report (07 Jul 2019 15:00):    NO GROWTH AT 24 HOURS    Culture - Blood (collected 06 Jul 2019 07:31)  Source: BLOOD VENOUS  Preliminary Report (08 Jul 2019 07:32):    NO ORGANISMS ISOLATED    NO ORGANISMS ISOLATED AT 48 HRS.    Culture - Blood (collected 06 Jul 2019 07:31)  Source: BLOOD PERIPHERAL  Preliminary Report (07 Jul 2019 10:50):    ***Blood Panel PCR results on this specimen are available    approximately 3 hours after the Gram stain result***    Gram stain, PCR, and/or culture results may not always    correspond due to difference in methodologies    ------------------------------------------------------------    This PCR assay was performed using [employer].  The    following targets are tested for:  Enterococcus, vancomycin    resistant enterococci, Listeria monocytogenes,  coagulase    negative staphylococci, S. aureus, methicillin resistant S.    aureus, Streptococcus agalactiae (Group B), S. pneumoniae,    S. pyogenes (Group A), Acinetobacter baumannii, Enterobacter    cloacae, E. coli, Klebsiella oxytoca, K. pneumoniae, Proteus    sp., Serratia marcescens, Haemophilus influenzae, Neisseria    meningitidis, Pseudomonas aeruginosa, Candida albicans, C.    glabrata, C. krusei, C. parapsilosis, C. tropicalis and the    KPC resistance gene.    **NOTE: Due to technical problems, Proteus sp. will NOT be    reported as part of the BCID paneluntil further notice.  Preliminary Report (07 Jul 2019 07:32):    NO ORGANISMS ISOLATED    NO ORGANISMS ISOLATED AT 24 HOURS  Organism: BLOOD CULTURE PCR  Escherichia coli (08 Jul 2019 13:49)  Organism: Escherichia coli (08 Jul 2019 13:49)  Organism: BLOOD CULTURE PCR (07 Jul 2019 10:50)

## 2019-07-08 NOTE — PROGRESS NOTE ADULT - PROBLEM SELECTOR PLAN 6
- c/w home sertraline  - Pt also reports being on sleeping pill, but unable to recall which one.  Niece will be bringing in all medications for verification - c/w home sertraline  - Pt also reports being on sleeping pill, but unable to recall which one. Will f/u

## 2019-07-08 NOTE — DISCHARGE NOTE PROVIDER - NSDCCPCAREPLAN_GEN_ALL_CORE_FT
PRINCIPAL DISCHARGE DIAGNOSIS  Diagnosis: Urinary tract infection  Assessment and Plan of Treatment: Continue antibiotics as directed and monitor for signs/symptoms of infection, such as, fever/chills, burning/pain with urination, urinary frequency/hesitancy, cloudy urine, or blood in urine.        SECONDARY DISCHARGE DIAGNOSES  Diagnosis: Sepsis  Assessment and Plan of Treatment: Complete antibiotic therapy as directed.  Monitor for any further signs and symptoms of further infection including but not limited to fevers, rigors, chills and or difficulty breathing. PRINCIPAL DISCHARGE DIAGNOSIS  Diagnosis: Urinary tract infection  Assessment and Plan of Treatment: Continue antibiotics as directed and monitor for signs/symptoms of infection, such as, fever/chills, burning/pain with urination, urinary frequency/hesitancy, cloudy urine, or blood in urine.        SECONDARY DISCHARGE DIAGNOSES  Diagnosis: Sepsis  Assessment and Plan of Treatment: Complete antibiotic therapy as directed.  Monitor for any further signs and symptoms of further infection including but not limited to fevers, rigors, chills and or difficulty breathing.      Diagnosis: Acute kidney injury  Assessment and Plan of Treatment: In order to prevent further disease progression, continue to follow recommendations made by your primary provider/nephrologist. Continue a diet that is low in sodium and avoid foods that are concentrated in potassium and phosphorus. Continue your medications/supplementations as directed and avoid over-the-counter drugs that are harmful to kidneys, such as, Non-Steroidal Anti-Inflammatory Drugs (NSAIDs). Follow-up as outpatient to monitor your kidney function, as well as, vitamin D, Calcium, potassium, and phosphorus levels.      Diagnosis: Hypothyroidism, unspecified type  Assessment and Plan of Treatment: Continue your medications as directed and please follow-up as an outpatient with your primary care provider for further care and recommendations.      Diagnosis: Essential hypertension  Assessment and Plan of Treatment: Continue blood pressure medication regimen as directed. Monitor for any visual changes, headaches or dizziness.  Monitor blood pressure regularly.  Follow up with your PCP for further management for high blood pressure.      Diagnosis: Mild intermittent asthma without complication  Assessment and Plan of Treatment: Please continue to use your inhalers as prescribed and follow-up with your primary care provider/pulmonologist for further care/recommendations. It is recommended to undergo annual pulmonary function testings. Monitor for signs/symptoms of COPD exacerbation, such as, shortness of breath, wheezing, difficulty breathing- Report to the emergency room if symptoms are not relieved by usual regimen.      Diagnosis: Depression, unspecified depression type  Assessment and Plan of Treatment: Continue your medications as directed and follow up with your PCP and psychiatrist for further evaluation and medical management. If you are ever in need of immediate psychiatric assistance you may reach out to the [facility] [phone].

## 2019-07-09 VITALS
HEART RATE: 74 BPM | RESPIRATION RATE: 18 BRPM | DIASTOLIC BLOOD PRESSURE: 71 MMHG | SYSTOLIC BLOOD PRESSURE: 152 MMHG | TEMPERATURE: 98 F | OXYGEN SATURATION: 100 %

## 2019-07-09 LAB
-  AMIKACIN: SIGNIFICANT CHANGE UP
-  AMPICILLIN/SULBACTAM: SIGNIFICANT CHANGE UP
-  AMPICILLIN: SIGNIFICANT CHANGE UP
-  AZTREONAM: SIGNIFICANT CHANGE UP
-  CEFAZOLIN: SIGNIFICANT CHANGE UP
-  CEFEPIME: SIGNIFICANT CHANGE UP
-  CEFOXITIN: SIGNIFICANT CHANGE UP
-  CEFTAZIDIME: SIGNIFICANT CHANGE UP
-  CEFTRIAXONE: SIGNIFICANT CHANGE UP
-  CIPROFLOXACIN: SIGNIFICANT CHANGE UP
-  ERTAPENEM: SIGNIFICANT CHANGE UP
-  GENTAMICIN: SIGNIFICANT CHANGE UP
-  IMIPENEM: SIGNIFICANT CHANGE UP
-  LEVOFLOXACIN: SIGNIFICANT CHANGE UP
-  MEROPENEM: SIGNIFICANT CHANGE UP
-  PIPERACILLIN/TAZOBACTAM: SIGNIFICANT CHANGE UP
-  TIGECYCLINE: SIGNIFICANT CHANGE UP
-  TOBRAMYCIN: SIGNIFICANT CHANGE UP
-  TRIMETHOPRIM/SULFAMETHOXAZOLE: SIGNIFICANT CHANGE UP
BACTERIA BLD CULT: SIGNIFICANT CHANGE UP
E COLI DNA BLD POS QL NAA+NON-PROBE: SIGNIFICANT CHANGE UP
METHOD TYPE: SIGNIFICANT CHANGE UP
SPECIMEN SOURCE: SIGNIFICANT CHANGE UP
SPECIMEN SOURCE: SIGNIFICANT CHANGE UP

## 2019-07-09 PROCEDURE — 99239 HOSP IP/OBS DSCHRG MGMT >30: CPT

## 2019-07-09 RX ORDER — DOCUSATE SODIUM 100 MG
1 CAPSULE ORAL
Qty: 0 | Refills: 0 | DISCHARGE
Start: 2019-07-09

## 2019-07-09 RX ORDER — SENNA PLUS 8.6 MG/1
2 TABLET ORAL
Qty: 0 | Refills: 0 | DISCHARGE
Start: 2019-07-09

## 2019-07-09 RX ORDER — CIPROFLOXACIN LACTATE 400MG/40ML
1 VIAL (ML) INTRAVENOUS
Qty: 8 | Refills: 0
Start: 2019-07-09 | End: 2019-07-12

## 2019-07-09 RX ORDER — LACTOBACILLUS ACIDOPHILUS 100MM CELL
1 CAPSULE ORAL
Qty: 0 | Refills: 0 | DISCHARGE
Start: 2019-07-09

## 2019-07-09 RX ADMIN — AMLODIPINE BESYLATE 5 MILLIGRAM(S): 2.5 TABLET ORAL at 05:54

## 2019-07-09 RX ADMIN — BUDESONIDE AND FORMOTEROL FUMARATE DIHYDRATE 2 PUFF(S): 160; 4.5 AEROSOL RESPIRATORY (INHALATION) at 08:20

## 2019-07-09 RX ADMIN — Medication 100 MILLIGRAM(S): at 12:35

## 2019-07-09 RX ADMIN — SERTRALINE 100 MILLIGRAM(S): 25 TABLET, FILM COATED ORAL at 12:35

## 2019-07-09 RX ADMIN — Medication 50 MICROGRAM(S): at 05:54

## 2019-07-09 RX ADMIN — Medication 100 MILLIGRAM(S): at 05:54

## 2019-07-09 RX ADMIN — Medication 1 TABLET(S): at 12:35

## 2019-07-09 NOTE — PROGRESS NOTE ADULT - ASSESSMENT
74F HTN, asthma, hypothyroidism, depression presents with severe sepsis 2/2 UTI/pyelonephritis associated with acute renal failure
74F HTN, asthma, hypothyroidism, depression presents with severe sepsis 2/2 UTI/pyelonephritis associated with acute renal failure
74F HTN, asthma, hypothyroidism, depression presents with severe sepsis 2/2 UTI/pyelonephritis associated with acute renal failure. WHITNEY resolved and clinically improved on ceftriaxone

## 2019-07-09 NOTE — PROGRESS NOTE ADULT - PROBLEM SELECTOR PROBLEM 5
Hypothyroidism, unspecified type
Hypothyroidism, unspecified type
Depression, unspecified depression type

## 2019-07-09 NOTE — PROGRESS NOTE ADULT - PROBLEM SELECTOR PLAN 5
no
- c/w home synthroid
- c/w home synthroid
- c/w home sertraline  - Pt also reports being on sleeping pill, but unable to recall which one. Will f/u

## 2019-07-09 NOTE — PROGRESS NOTE ADULT - PROBLEM SELECTOR PLAN 4
c/w home amlodipine  BP in acceptable range
c/w home amlodipine  BP in acceptable range
- c/w home synthroid

## 2019-07-09 NOTE — PROGRESS NOTE ADULT - PROBLEM SELECTOR PLAN 1
Pt with pyelonephritis c/b E. coli bacteremia. Sepsis resolved. urine cx NGTD  - d/c ceftriaxone --> start cipro 500mg BID to complete total of 7 days of abx  - f/u repeat BCx, currently no growth

## 2019-07-09 NOTE — PROGRESS NOTE ADULT - PROBLEM SELECTOR PLAN 7
- No signs of asthma exacerbation  c/w Symbicort, pt does not use rescue inhaler
- No signs of asthma exacerbation  c/w Symbicort, pt does not use rescue inhaler
RISK                                                                Points  [  ] Previous VTE                                                  3  [  ] Thrombophilia                                               2  [  ] Lower limb paralysis                                      2        (unable to hold up >15 seconds)    [  ] Current Cancer                                              2         (within 6 months)  [  ] Immobilization > 24 hrs                                1  [  ] ICU/CCU stay > 24 hours                              1  [ x ] Age > 60                                                      1    IMPROVE VTE Score 1    IMPROVE Score 0-1: Low Risk, encourage ambulation.    DISPO: Patient medically stable to be discharged. Spent 35 min coordinating d/c plan

## 2019-07-09 NOTE — DISCHARGE NOTE NURSING/CASE MANAGEMENT/SOCIAL WORK - NSDCDPATPORTLINK_GEN_ALL_CORE
You can access the [facility] Patient Portal, offered by [facility], by registering with the following website: [email or web address]

## 2019-07-09 NOTE — PROGRESS NOTE ADULT - SUBJECTIVE AND OBJECTIVE BOX
Patient is a 74y old  Female who presents with a chief complaint of Pyelonephritis c/b E. coli bacteremia (08 Jul 2019 19:23)        SUBJECTIVE / OVERNIGHT EVENTS:      MEDICATIONS  (STANDING):  amLODIPine   Tablet 5 milliGRAM(s) Oral daily  buDESOnide  80 MICROgram(s)/formoterol 4.5 MICROgram(s) Inhaler 2 Puff(s) Inhalation two times a day  cefTRIAXone   IVPB 1000 milliGRAM(s) IV Intermittent every 24 hours  docusate sodium 100 milliGRAM(s) Oral three times a day  lactobacillus acidophilus 1 Tablet(s) Oral daily  levothyroxine 50 MICROGram(s) Oral daily  senna 2 Tablet(s) Oral at bedtime  sertraline 100 milliGRAM(s) Oral daily    MEDICATIONS  (PRN):  acetaminophen   Tablet .. 650 milliGRAM(s) Oral every 6 hours PRN Moderate Pain (4 - 6), Severe Pain (7 - 10)      Vital Signs Last 24 Hrs  T(C): 36.7 (09 Jul 2019 06:00), Max: 36.7 (09 Jul 2019 06:00)  T(F): 98.1 (09 Jul 2019 06:00), Max: 98.1 (09 Jul 2019 06:00)  HR: 74 (09 Jul 2019 06:00) (72 - 74)  BP: 152/71 (09 Jul 2019 06:00) (152/71 - 155/79)  BP(mean): --  RR: 18 (09 Jul 2019 06:00) (18 - 18)  SpO2: 100% (09 Jul 2019 06:00) (100% - 100%)  CAPILLARY BLOOD GLUCOSE        I&O's Summary        PHYSICAL EXAM  GENERAL: NAD, well-developed  HEAD:  Atraumatic, Normocephalic  EYES: EOMI, PERRLA, conjunctiva and sclera clear  NECK: Supple, No JVD  CHEST/LUNG: Clear to auscultation bilaterally; No wheeze  HEART: Regular rate and rhythm; No murmurs, rubs, or gallops  ABDOMEN: Soft, Nontender, Nondistended; Bowel sounds present  EXTREMITIES:  2+ Peripheral Pulses, No clubbing, cyanosis, or edema  PSYCH: AAOx3  SKIN: No rashes or lesions    LABS:                        10.4   9.46  )-----------( 184      ( 08 Jul 2019 07:39 )             31.2     07-08    139  |  103  |  13  ----------------------------<  104<H>  4.2   |  23  |  1.01    Ca    9.5      08 Jul 2019 07:39  Phos  3.8     07-08  Mg     2.0     07-08                RADIOLOGY & ADDITIONAL TESTS:    Imaging Personally Reviewed:  Consultant(s) Notes Reviewed:    Care Discussed with Consultants/Other Providers: Patient is a 74y old  Female who presents with a chief complaint of Pyelonephritis c/b E. coli bacteremia (08 Jul 2019 19:23)    SUBJECTIVE / OVERNIGHT EVENTS:  Patient feels well. NO fever, chills, n/v or abdominal pain. No chest pain or SOB    MEDICATIONS  (STANDING):  amLODIPine   Tablet 5 milliGRAM(s) Oral daily  buDESOnide  80 MICROgram(s)/formoterol 4.5 MICROgram(s) Inhaler 2 Puff(s) Inhalation two times a day  cefTRIAXone   IVPB 1000 milliGRAM(s) IV Intermittent every 24 hours  docusate sodium 100 milliGRAM(s) Oral three times a day  lactobacillus acidophilus 1 Tablet(s) Oral daily  levothyroxine 50 MICROGram(s) Oral daily  senna 2 Tablet(s) Oral at bedtime  sertraline 100 milliGRAM(s) Oral daily    MEDICATIONS  (PRN):  acetaminophen   Tablet .. 650 milliGRAM(s) Oral every 6 hours PRN Moderate Pain (4 - 6), Severe Pain (7 - 10)      Vital Signs Last 24 Hrs  T(C): 36.7 (09 Jul 2019 06:00), Max: 36.7 (09 Jul 2019 06:00)  T(F): 98.1 (09 Jul 2019 06:00), Max: 98.1 (09 Jul 2019 06:00)  HR: 74 (09 Jul 2019 06:00) (72 - 74)  BP: 152/71 (09 Jul 2019 06:00) (152/71 - 155/79)  BP(mean): --  RR: 18 (09 Jul 2019 06:00) (18 - 18)  SpO2: 100% (09 Jul 2019 06:00) (100% - 100%)          PHYSICAL EXAM  GENERAL: NAD, well-developed  CHEST/LUNG: Clear to auscultation bilaterally; No wheeze  HEART: Regular rate and rhythm; No murmurs, rubs, or gallops  ABDOMEN: Soft, Nontender, Nondistended; Bowel sounds present  EXTREMITIES:  2+ Peripheral Pulses, No clubbing, cyanosis, or edema  PSYCH: AAOx3  SKIN: No rashes or lesions          Consultant(s) Notes Reviewed:    Care Discussed with Consultants/Other Providers:

## 2019-07-09 NOTE — PROGRESS NOTE ADULT - PROBLEM SELECTOR PLAN 2
- Likely sepsis mediated in addition to dehydration in setting of 3 days of N/V  Cr improved   - Encourage PO hydration
- Likely sepsis mediated in addition to dehydration in setting of 3 days of N/V  Cr improved   - Encourage PO hydration
Resolved

## 2019-07-09 NOTE — PROGRESS NOTE ADULT - PROBLEM SELECTOR PROBLEM 6
Depression, unspecified depression type
Depression, unspecified depression type
Mild intermittent asthma without complication

## 2019-07-11 LAB — BACTERIA BLD CULT: SIGNIFICANT CHANGE UP

## 2019-07-13 LAB
BACTERIA BLD CULT: SIGNIFICANT CHANGE UP
BACTERIA BLD CULT: SIGNIFICANT CHANGE UP

## 2021-04-13 NOTE — PATIENT PROFILE ADULT - NSPROGENSOURCEINFO_GEN_A_NUR
Note Text (......Xxx Chief Complaint.): This diagnosis correlates with the
Detail Level: Zone
patient

## 2022-10-07 NOTE — ED ADULT NURSE NOTE - NS ED NOTE ABUSE SUSPICION NEGLECT YN
Protocol failed or has No Protocol, please review. rxappt  90 day refill given on 10/07/22, appointment needed for further refills. Requested Prescriptions   Pending Prescriptions Disp Refills    fluticasone-salmeterol (Alfonso Buggy) 250-50 MCG/ACT Inhalation Aerosol Powder, Breath Activated 60 each 0     Sig: Inhale 1 puff into the lungs Q12H.         Asthma & COPD Medication Protocol Failed - 10/6/2022 10:52 AM        Failed - In person appointment or virtual visit in the past 6 mos or appointment in next 3 mos       Recent Outpatient Visits              11 months ago Physical exam    Karen Cooper MD    Office Visit    11 months ago Encounter for gynecological examination without abnormal finding    [facility], [address], [place] Vivienne Dakin, MD    Office Visit    2 years ago Moderate persistent asthma without complication    Karen Cooper MD    Office Visit    2 years ago Immunization due    [facility], [address] [facility]    Nurse Only    2 years ago Encounter for PPD skin test reading    [facility], [address] [facility]    Nurse Only     Future Appointments         Provider Department Appt Notes    In 1 month Poncho Linda MD [facility], [address] [facility], [facility]         Provider Department Appt Notes    In 1 month Poncho Linda MD [facility], [address] [facility], [facility]          Recent Outpatient Visits              11 months ago Physical exam    Karen Cooper MD    Office Visit    11 months ago Encounter for gynecological examination without abnormal finding    [address] [facility], [place] Vivienne Dakin, MD    Office Visit    2 years ago Moderate persistent asthma without complication    [facility], [facility] Fish Velazquez MD    Office Visit    2 years ago Immunization due    [facility], [facility], [address]    Nurse Only    2 years ago Encounter for PPD skin test reading    [facility], [facility], [address]    Nurse Only No

## 2023-07-14 NOTE — PATIENT PROFILE ADULT - NSPROMEDSPUMP_GEN_A_NUR
No care due was identified.  [facility] Embedded Care Due Messages. Reference number: [id number].   7/14/2023 11:27:03 AM CDT   none

## 2023-11-28 NOTE — PROVIDER CONTACT NOTE (CRITICAL VALUE NOTIFICATION) - TEST AND RESULT REPORTED:
Attempted to contact Kerry with [facility], no answer, left voicemail patient needs an appt for evaluation.   Positive blood cultures, anaerobic bottle